# Patient Record
Sex: FEMALE | Race: BLACK OR AFRICAN AMERICAN | Employment: FULL TIME | URBAN - METROPOLITAN AREA
[De-identification: names, ages, dates, MRNs, and addresses within clinical notes are randomized per-mention and may not be internally consistent; named-entity substitution may affect disease eponyms.]

---

## 2018-09-04 ENCOUNTER — HOSPITAL ENCOUNTER (EMERGENCY)
Age: 53
Discharge: HOME | End: 2018-09-04
Attending: EMERGENCY MEDICINE
Payer: SELF-PAY

## 2018-09-04 VITALS
TEMPERATURE: 98.9 F | HEART RATE: 88 BPM | DIASTOLIC BLOOD PRESSURE: 71 MMHG | HEIGHT: 60 IN | RESPIRATION RATE: 17 BRPM | BODY MASS INDEX: 26.5 KG/M2 | SYSTOLIC BLOOD PRESSURE: 114 MMHG | WEIGHT: 135 LBS | OXYGEN SATURATION: 98 %

## 2018-09-04 DIAGNOSIS — A59.9 TRICHOMONIASIS: ICD-10-CM

## 2018-09-04 DIAGNOSIS — N73.0 PID (ACUTE PELVIC INFLAMMATORY DISEASE): Primary | ICD-10-CM

## 2018-09-04 LAB
BACTERIA URNS QL MICRO: NORMAL /HPF
CASTS URNS QL MICRO: NORMAL /LPF
CRYSTALS URNS QL MICRO: NORMAL /LPF
EPI CELLS #/AREA URNS HPF: NORMAL /HPF
MUCOUS THREADS URNS QL MICRO: 0 /LPF
OTHER OBSERVATIONS,UCOM: NORMAL
RBC #/AREA URNS HPF: NORMAL /HPF
SERVICE CMNT-IMP: NORMAL
WBC URNS QL MICRO: NORMAL /HPF
WET PREP GENITAL: NORMAL

## 2018-09-04 PROCEDURE — 99283 EMERGENCY DEPT VISIT LOW MDM: CPT | Performed by: NURSE PRACTITIONER

## 2018-09-04 PROCEDURE — 87210 SMEAR WET MOUNT SALINE/INK: CPT

## 2018-09-04 PROCEDURE — 81003 URINALYSIS AUTO W/O SCOPE: CPT | Performed by: NURSE PRACTITIONER

## 2018-09-04 PROCEDURE — 74011250637 HC RX REV CODE- 250/637: Performed by: NURSE PRACTITIONER

## 2018-09-04 PROCEDURE — 87491 CHLMYD TRACH DNA AMP PROBE: CPT

## 2018-09-04 PROCEDURE — 96372 THER/PROPH/DIAG INJ SC/IM: CPT | Performed by: NURSE PRACTITIONER

## 2018-09-04 PROCEDURE — 87086 URINE CULTURE/COLONY COUNT: CPT

## 2018-09-04 PROCEDURE — 74011250636 HC RX REV CODE- 250/636: Performed by: NURSE PRACTITIONER

## 2018-09-04 PROCEDURE — 81015 MICROSCOPIC EXAM OF URINE: CPT

## 2018-09-04 RX ORDER — METRONIDAZOLE 500 MG/1
2000 TABLET ORAL
Qty: 4 TAB | Refills: 0 | Status: SHIPPED | OUTPATIENT
Start: 2018-09-04 | End: 2018-09-04

## 2018-09-04 RX ORDER — ONDANSETRON 4 MG/1
4 TABLET, ORALLY DISINTEGRATING ORAL
Status: COMPLETED | OUTPATIENT
Start: 2018-09-04 | End: 2018-09-04

## 2018-09-04 RX ORDER — AZITHROMYCIN 250 MG/1
1000 TABLET, FILM COATED ORAL
Status: COMPLETED | OUTPATIENT
Start: 2018-09-04 | End: 2018-09-04

## 2018-09-04 RX ORDER — DOXYCYCLINE HYCLATE 100 MG
100 TABLET ORAL 2 TIMES DAILY
Qty: 14 TAB | Refills: 0 | Status: SHIPPED | OUTPATIENT
Start: 2018-09-04 | End: 2018-09-11

## 2018-09-04 RX ADMIN — AZITHROMYCIN 1000 MG: 250 TABLET, FILM COATED ORAL at 11:03

## 2018-09-04 RX ADMIN — ONDANSETRON 4 MG: 4 TABLET, ORALLY DISINTEGRATING ORAL at 11:02

## 2018-09-04 RX ADMIN — CEFTRIAXONE SODIUM 250 MG: 250 INJECTION, POWDER, FOR SOLUTION INTRAMUSCULAR; INTRAVENOUS at 11:02

## 2018-09-04 NOTE — ED NOTES
I have reviewed discharge instructions with the patient. The patient verbalized understanding. Patient left ED via Discharge Method: ambulatory to Home with (insert name of family/friend, self, transport self). Opportunity for questions and clarification provided. Patient given 2 scripts. To continue your aftercare when you leave the hospital, you may receive an automated call from our care team to check in on how you are doing. This is a free service and part of our promise to provide the best care and service to meet your aftercare needs.  If you have questions, or wish to unsubscribe from this service please call [phone]. Thank you for Choosing our [facility] Department.

## 2018-09-04 NOTE — ED TRIAGE NOTES
Pt states having pelvic pain, vaginal discharge and a vaginal odor that started on Thursday last week. Pt also states having lower back pain.

## 2018-09-04 NOTE — ED PROVIDER NOTES
HPI Comments: Patient states 1 month ago she had sexual intercourse with her ex boyfriend. Patient states after wards she has developed worsening pelvic pain. She states pain with urination and vaginal discharge. She states vaginal odor and lower back pain. The history is provided by the patient. History reviewed. No pertinent past medical history. Past Surgical History:  
Procedure Laterality Date  BREAST SURGERY PROCEDURE UNLISTED  HX GYN    
 c section History reviewed. No pertinent family history. Social History Social History  Marital status: SINGLE Spouse name: N/A  
 Number of children: N/A  
 Years of education: N/A Occupational History  Not on file. Social History Main Topics  Smoking status: Current Every Day Smoker  Smokeless tobacco: Never Used  Alcohol use Yes  Drug use: No  
 Sexual activity: Not on file Other Topics Concern  Not on file Social History Narrative  No narrative on file ALLERGIES: Pcn [penicillins] Review of Systems Constitutional: Negative for chills and fever. Respiratory: Negative for cough and shortness of breath. Gastrointestinal: Positive for nausea. Genitourinary: Positive for dysuria. Negative for difficulty urinating. Musculoskeletal: Positive for back pain. Vitals:  
 09/04/18 0925 09/04/18 1141 BP: 116/78 114/71 Pulse: 94 88 Resp: 18 17 Temp: 98.7 °F (37.1 °C) 98.9 °F (37.2 °C) SpO2: 97% 98% Weight: 61.2 kg (135 lb) Height: 5' (1.524 m) Physical Exam  
Constitutional: He is oriented to person, place, and time. No distress. Cardiovascular: Normal rate and regular rhythm. No murmur heard. Pulmonary/Chest: Effort normal and breath sounds normal. No respiratory distress. Abdominal: Soft. Normal appearance and bowel sounds are normal. There is tenderness in the right lower quadrant, suprapubic area and left lower quadrant. Musculoskeletal: Normal range of motion. Lumbar back: He exhibits tenderness and pain. Neurological: He is alert and oriented to person, place, and time. Skin: Skin is warm and dry. He is not diaphoretic. No erythema. Psychiatric: He has a normal mood and affect. His behavior is normal.  
Nursing note and vitals reviewed. Recent Results (from the past 12 hour(s)) URINE MICROSCOPIC Collection Time: 09/04/18 10:17 AM  
Result Value Ref Range WBC 20-50 0 /hpf  
 RBC 0-3 0 /hpf Epithelial cells 0-3 0 /hpf Bacteria TRACE 0 /hpf Casts 0-3 0 /lpf Crystals, urine MODERATE 0 /LPF Mucus 0 0 /lpf Other observations RESULTS VERIFIED MANUALLY    
WET PREP Collection Time: 09/04/18 10:21 AM  
Result Value Ref Range Special Requests: NO SPECIAL REQUESTS Wet prep MODERATE MOTILE TRICHOMONAS NOTED Wet prep 5 TO 10 WBC'S PER HPF Wet prep NO YEAST SEEN Wet prep CLUE CELLS ABSENT    
 
 
MDM Number of Diagnoses or Management Options PID (acute pelvic inflammatory disease): Trichomoniasis:  
Diagnosis management comments: Wet prep positive for trichomoniasis. GC/C swabs pending at this time. Patient treated imperially with IM rocephin and po azithromycin. Urine sent for culture. Patient given prescription for doxycyline and flagyl. Amount and/or Complexity of Data Reviewed Clinical lab tests: ordered and reviewed Tests in the medicine section of CPT®: ordered Patient Progress Patient progress: stable ED Course Pelvic Exam 
Date/Time: 9/4/2018 5:02 PM 
Performed by: NP 
Type of exam performed: bimanual and speculum. External genitalia appearance: normal.   
Vaginal exam:  discharge. The amount of discharge was:  moderate. The discharge was thin. Cervical exam:  moderate cervical motion tenderness. Specimen(s) collected:  chlamydia, GC and vaginal culture.  
Bimanual exam:  left adenexal tenderness, right adenexal tenderness and uterine tenderness. Patient tolerance: Patient tolerated the procedure well with no immediate complications

## 2018-09-04 NOTE — LETTER
[facility] [facility] EMERGENCY DEPT [address] [id number] [phone] Work/School Note Date: 9/4/2018 To Whom It May concern: 
 
Carlitos Valencia was seen and treated today in the emergency room by the following provider(s): 
Attending Provider: Jayden Madera MD 
Nurse Practitioner: TESS Longoria. Carlitos Valencia was seen in the Emergency Department 09/04/2018.  
 
Sincerely, 
 
 
 
 
TESS Longoria

## 2018-09-06 LAB
C TRACH RRNA SPEC QL NAA+PROBE: NEGATIVE
N GONORRHOEA RRNA SPEC QL NAA+PROBE: NEGATIVE
SPECIMEN SOURCE: NORMAL

## 2018-09-07 LAB
BACTERIA SPEC CULT: NORMAL
SERVICE CMNT-IMP: NORMAL